# Patient Record
Sex: FEMALE | Race: WHITE | URBAN - METROPOLITAN AREA
[De-identification: names, ages, dates, MRNs, and addresses within clinical notes are randomized per-mention and may not be internally consistent; named-entity substitution may affect disease eponyms.]

---

## 2021-08-28 LAB
ANTIBODY SCREEN OB: NEGATIVE
HEPATITIS B SURFACE ANTIGEN OB: NEGATIVE
HIV RESULT OB: NEGATIVE
RAPID PLASMA REAGIN OB: NONREACTIVE
RH FACTOR OB: POSITIVE

## 2022-01-06 LAB — HIV RESULT OB: NEGATIVE

## 2022-03-03 LAB — STREP GP B CULT OB: NEGATIVE

## 2022-03-29 ENCOUNTER — HOSPITAL ENCOUNTER (INPATIENT)
Facility: HOSPITAL | Age: 32
LOS: 3 days | Discharge: HOME | End: 2022-04-01
Attending: OBSTETRICS & GYNECOLOGY | Admitting: OBSTETRICS & GYNECOLOGY
Payer: COMMERCIAL

## 2022-03-29 PROBLEM — Z34.90 PREGNANCY: Status: ACTIVE | Noted: 2022-03-29

## 2022-03-29 LAB
ANTIBODY SCREEN: NEGATIVE
BASOPHILS # BLD AUTO: 0.07 X10(3) UL (ref 0–0.2)
BASOPHILS NFR BLD AUTO: 0.6 %
EOSINOPHIL # BLD AUTO: 0.13 X10(3) UL (ref 0–0.7)
EOSINOPHIL NFR BLD AUTO: 1.1 %
ERYTHROCYTE [DISTWIDTH] IN BLOOD BY AUTOMATED COUNT: 12.8 %
HCT VFR BLD AUTO: 41.4 %
HGB BLD-MCNC: 14.1 G/DL
IMM GRANULOCYTES # BLD AUTO: 0.06 X10(3) UL (ref 0–1)
IMM GRANULOCYTES NFR BLD: 0.5 %
LYMPHOCYTES # BLD AUTO: 2.17 X10(3) UL (ref 1–4)
LYMPHOCYTES NFR BLD AUTO: 17.8 %
MCH RBC QN AUTO: 29.3 PG (ref 26–34)
MCHC RBC AUTO-ENTMCNC: 34.1 G/DL (ref 31–37)
MCV RBC AUTO: 85.9 FL
MONOCYTES # BLD AUTO: 0.72 X10(3) UL (ref 0.1–1)
MONOCYTES NFR BLD AUTO: 5.9 %
NEUTROPHILS # BLD AUTO: 9.06 X10 (3) UL (ref 1.5–7.7)
NEUTROPHILS # BLD AUTO: 9.06 X10(3) UL (ref 1.5–7.7)
NEUTROPHILS NFR BLD AUTO: 74.1 %
PLATELET # BLD AUTO: 268 10(3)UL (ref 150–450)
RBC # BLD AUTO: 4.82 X10(6)UL
RH BLOOD TYPE: POSITIVE
SARS-COV-2 RNA RESP QL NAA+PROBE: NOT DETECTED
T PALLIDUM AB SER QL IA: NONREACTIVE
WBC # BLD AUTO: 12.2 X10(3) UL (ref 4–11)

## 2022-03-29 PROCEDURE — 99204 OFFICE O/P NEW MOD 45 MIN: CPT

## 2022-03-29 PROCEDURE — 86900 BLOOD TYPING SEROLOGIC ABO: CPT | Performed by: OBSTETRICS & GYNECOLOGY

## 2022-03-29 PROCEDURE — 86780 TREPONEMA PALLIDUM: CPT | Performed by: OBSTETRICS & GYNECOLOGY

## 2022-03-29 PROCEDURE — 85025 COMPLETE CBC W/AUTO DIFF WBC: CPT | Performed by: OBSTETRICS & GYNECOLOGY

## 2022-03-29 PROCEDURE — 86850 RBC ANTIBODY SCREEN: CPT | Performed by: OBSTETRICS & GYNECOLOGY

## 2022-03-29 PROCEDURE — 86901 BLOOD TYPING SEROLOGIC RH(D): CPT | Performed by: OBSTETRICS & GYNECOLOGY

## 2022-03-29 RX ORDER — TRISODIUM CITRATE DIHYDRATE AND CITRIC ACID MONOHYDRATE 500; 334 MG/5ML; MG/5ML
30 SOLUTION ORAL AS NEEDED
Status: DISCONTINUED | OUTPATIENT
Start: 2022-03-29 | End: 2022-03-30 | Stop reason: HOSPADM

## 2022-03-29 RX ORDER — ERGOCALCIFEROL (VITAMIN D2) 10 MCG
400 TABLET ORAL DAILY
COMMUNITY

## 2022-03-29 RX ORDER — SODIUM CHLORIDE, SODIUM LACTATE, POTASSIUM CHLORIDE, CALCIUM CHLORIDE 600; 310; 30; 20 MG/100ML; MG/100ML; MG/100ML; MG/100ML
INJECTION, SOLUTION INTRAVENOUS CONTINUOUS
Status: DISCONTINUED | OUTPATIENT
Start: 2022-03-29 | End: 2022-03-30 | Stop reason: HOSPADM

## 2022-03-29 RX ORDER — ACETAMINOPHEN 500 MG
1000 TABLET ORAL EVERY 6 HOURS PRN
Status: DISCONTINUED | OUTPATIENT
Start: 2022-03-29 | End: 2022-03-30 | Stop reason: DRUGHIGH

## 2022-03-29 RX ORDER — QUINIDINE GLUCONATE 324 MG
240 TABLET, EXTENDED RELEASE ORAL
COMMUNITY
End: 2022-04-01

## 2022-03-29 RX ORDER — TERBUTALINE SULFATE 1 MG/ML
0.25 INJECTION, SOLUTION SUBCUTANEOUS AS NEEDED
Status: DISCONTINUED | OUTPATIENT
Start: 2022-03-29 | End: 2022-03-30 | Stop reason: HOSPADM

## 2022-03-29 RX ORDER — DEXTROSE, SODIUM CHLORIDE, SODIUM LACTATE, POTASSIUM CHLORIDE, AND CALCIUM CHLORIDE 5; .6; .31; .03; .02 G/100ML; G/100ML; G/100ML; G/100ML; G/100ML
INJECTION, SOLUTION INTRAVENOUS AS NEEDED
Status: DISCONTINUED | OUTPATIENT
Start: 2022-03-29 | End: 2022-03-30 | Stop reason: HOSPADM

## 2022-03-29 RX ORDER — PRENATAL VIT/IRON FUM/FOLIC AC 27 MG-1 MG
1 TABLET ORAL DAILY
COMMUNITY

## 2022-03-29 RX ORDER — ONDANSETRON 2 MG/ML
4 INJECTION INTRAMUSCULAR; INTRAVENOUS EVERY 6 HOURS PRN
Status: DISCONTINUED | OUTPATIENT
Start: 2022-03-29 | End: 2022-03-30 | Stop reason: HOSPADM

## 2022-03-29 RX ORDER — AMMONIA INHALANTS 0.04 G/.3ML
0.3 INHALANT RESPIRATORY (INHALATION) AS NEEDED
Status: DISCONTINUED | OUTPATIENT
Start: 2022-03-29 | End: 2022-03-30 | Stop reason: HOSPADM

## 2022-03-29 RX ORDER — HYDROMORPHONE HYDROCHLORIDE 1 MG/ML
1 INJECTION, SOLUTION INTRAMUSCULAR; INTRAVENOUS; SUBCUTANEOUS ONCE
Status: COMPLETED | OUTPATIENT
Start: 2022-03-29 | End: 2022-03-29

## 2022-03-29 RX ORDER — IBUPROFEN 600 MG/1
600 TABLET ORAL EVERY 6 HOURS PRN
Status: DISCONTINUED | OUTPATIENT
Start: 2022-03-29 | End: 2022-03-30 | Stop reason: DRUGHIGH

## 2022-03-29 NOTE — PROGRESS NOTES
Dr Marcelino Floor calls this RN back. This RN informs her pt , 40+1. Pt here for SROM. Pt started leaking fluid at 1200 today. +pooling, +ferning, +amniotest. SVE /-2. Pt having occas ctxs, pt not feeling them. NST reactive. Orders received at this time.

## 2022-03-29 NOTE — PROGRESS NOTES
, 40+1 weeks arrives per ambulation. Pt here for SROM. Pt taken to Triage 3 and changing at this time. pt was just d/freedom an hour ago with so cath .

## 2022-03-29 NOTE — PROGRESS NOTES
EFMs applied, FHTs 140. Pt states she started leaking fluid at 1200 today. Pt states she continues to have small trickles of clear fluid. Pt denies ctxs and vaginal bleeding. +FM. Pt denies any problems with pregnancy. Pt has anemia, denies any other medical problems.

## 2022-03-29 NOTE — PROGRESS NOTES
Pt back in room, EFMs applied, FHTs 155. Pt states she is feeling more ctxs. Pt states they feeling more like \"period cramps\" now. POC discussed with pt. Pt verbalizes understanding and agrees with POC.

## 2022-03-29 NOTE — PROGRESS NOTES
Pt is a 36year old female admitted to -A. Patient presents with:  R/o Rom: LOF since 1200     Pt is  40w1d intra-uterine pregnancy. History obtained, consents signed. Oriented to room, staff, and plan of care.

## 2022-03-29 NOTE — PROGRESS NOTES
Pt transferred to 103 per ambulation. Report given to Marha Xavier RN. Cares of pt transferred at this time.

## 2022-03-30 ENCOUNTER — ANESTHESIA (OUTPATIENT)
Dept: OBGYN UNIT | Facility: HOSPITAL | Age: 32
End: 2022-03-30
Payer: COMMERCIAL

## 2022-03-30 ENCOUNTER — ANESTHESIA EVENT (OUTPATIENT)
Dept: OBGYN UNIT | Facility: HOSPITAL | Age: 32
End: 2022-03-30
Payer: COMMERCIAL

## 2022-03-30 PROCEDURE — 0KQM0ZZ REPAIR PERINEUM MUSCLE, OPEN APPROACH: ICD-10-PCS | Performed by: STUDENT IN AN ORGANIZED HEALTH CARE EDUCATION/TRAINING PROGRAM

## 2022-03-30 RX ORDER — LIDOCAINE HYDROCHLORIDE 10 MG/ML
INJECTION, SOLUTION EPIDURAL; INFILTRATION; INTRACAUDAL; PERINEURAL AS NEEDED
Status: DISCONTINUED | OUTPATIENT
Start: 2022-03-30 | End: 2022-03-30 | Stop reason: SURG

## 2022-03-30 RX ORDER — IBUPROFEN 600 MG/1
600 TABLET ORAL EVERY 6 HOURS
Status: DISCONTINUED | OUTPATIENT
Start: 2022-03-31 | End: 2022-04-01

## 2022-03-30 RX ORDER — SIMETHICONE 80 MG
80 TABLET,CHEWABLE ORAL 3 TIMES DAILY PRN
Status: DISCONTINUED | OUTPATIENT
Start: 2022-03-30 | End: 2022-04-01

## 2022-03-30 RX ORDER — BISACODYL 10 MG
10 SUPPOSITORY, RECTAL RECTAL ONCE AS NEEDED
Status: DISCONTINUED | OUTPATIENT
Start: 2022-03-30 | End: 2022-04-01

## 2022-03-30 RX ORDER — METHYLERGONOVINE MALEATE 0.2 MG/ML
INJECTION INTRAVENOUS
Status: DISCONTINUED
Start: 2022-03-30 | End: 2022-03-30 | Stop reason: WASHOUT

## 2022-03-30 RX ORDER — BUPIVACAINE HCL/0.9 % NACL/PF 0.25 %
5 PLASTIC BAG, INJECTION (ML) EPIDURAL AS NEEDED
Status: DISCONTINUED | OUTPATIENT
Start: 2022-03-30 | End: 2022-03-30

## 2022-03-30 RX ORDER — LIDOCAINE HYDROCHLORIDE AND EPINEPHRINE 15; 5 MG/ML; UG/ML
INJECTION, SOLUTION EPIDURAL AS NEEDED
Status: DISCONTINUED | OUTPATIENT
Start: 2022-03-30 | End: 2022-03-30 | Stop reason: SURG

## 2022-03-30 RX ORDER — DOCUSATE SODIUM 100 MG/1
100 CAPSULE, LIQUID FILLED ORAL
Status: DISCONTINUED | OUTPATIENT
Start: 2022-03-30 | End: 2022-04-01

## 2022-03-30 RX ORDER — TRANEXAMIC ACID 10 MG/ML
INJECTION, SOLUTION INTRAVENOUS
Status: DISCONTINUED
Start: 2022-03-30 | End: 2022-03-30 | Stop reason: WASHOUT

## 2022-03-30 RX ORDER — ACETAMINOPHEN 500 MG
500 TABLET ORAL EVERY 6 HOURS PRN
Status: DISCONTINUED | OUTPATIENT
Start: 2022-03-30 | End: 2022-04-01

## 2022-03-30 RX ORDER — NALBUPHINE HCL 10 MG/ML
2.5 AMPUL (ML) INJECTION
Status: DISCONTINUED | OUTPATIENT
Start: 2022-03-30 | End: 2022-03-30

## 2022-03-30 RX ORDER — CARBOPROST TROMETHAMINE 250 UG/ML
INJECTION, SOLUTION INTRAMUSCULAR
Status: DISCONTINUED
Start: 2022-03-30 | End: 2022-03-30 | Stop reason: WASHOUT

## 2022-03-30 RX ORDER — ACETAMINOPHEN 500 MG
1000 TABLET ORAL EVERY 6 HOURS PRN
Status: DISCONTINUED | OUTPATIENT
Start: 2022-03-30 | End: 2022-04-01

## 2022-03-30 RX ADMIN — LIDOCAINE HYDROCHLORIDE 10 MG: 10 INJECTION, SOLUTION EPIDURAL; INFILTRATION; INTRACAUDAL; PERINEURAL at 01:32:00

## 2022-03-30 RX ADMIN — LIDOCAINE HYDROCHLORIDE AND EPINEPHRINE 5 ML: 15; 5 INJECTION, SOLUTION EPIDURAL at 01:34:00

## 2022-03-30 NOTE — H&P
[facility]    PATIENT'S NAME: Bharathi Jack   ATTENDING PHYSICIAN: Chapito Mariee M.D. PATIENT ACCOUNT#:   [de-identified]    LOCATION:  [address] [place] [address]  MEDICAL RECORD #:   [id number]       YOB: 1990  ADMISSION DATE:       2022    HISTORY AND PHYSICAL EXAMINATION    HISTORY OF PRESENT ILLNESS:  She is a 72-year-old female. She is a  1, para 0, with a due date of 2022, making her 40-1/7 weeks today. She presents to Labor and Delivery with spontaneous rupture of membranes today at noon, clear fluid. On admission to Labor and Delivery, she was noted to be ken irregularly and in no discomfort. She had gross rupture of membranes in triage. She states good fetal movement. Her prenatal care has been good with us. Her prenatal labs are as follows. She initially had the following lab work. Toxoplasma IgG negative. Hemoglobin electrophoresis normal.   Those laboratory values were done on 2021. Her hemoglobin and hematocrit 13.3 and 39, platelets were 899. Her hepatitis B surface antigen was negative. Her rubella was reactive and immune. Her blood type is O positive. Antibody screen negative. Her HIV is nonreactive in the first trimester. She had RPR nonreactive. Her urinalysis was negative. Her vitamin D was 33. Her ferritin of 62. Her urine culture was no growth. She also had parvovirus nonimmune early in the pregnancy. Following that, her group B strep that was done at 36 weeks was negative. Her followup hemoglobin and hematocrit in the third trimester were 12.4 and 36.5, normal platelets. A 1-hour Glucola was normal.  Vitamin D was 49. HIV was negative. PAST MEDICAL HISTORY:  She has a history of strep throat in 2021, treated with penicillin. She had COVID infection in 2021. PAST REPRODUCTIVE HISTORY:  Menarche at age 17, 28-day cycles for 5 days. This is her first pregnancy.     MEDICATIONS:  She takes are vitamin D3 once daily. She takes prenatal vitamins once daily. ALLERGIES:  None. SOCIAL HISTORY:  No smoking, no alcohol, no drug use. FAMILY HISTORY:  Significant for diabetes in her aunt and heart disease in her grandmother. PHYSICAL EXAMINATION:    GENERAL:  The patient is up and ambulating and appears in no distress at this time. VITAL SIGNS:  She is 5 feet 4 inches tall, weighs 156 pounds. Vitals are stable. Strip is reactive with irregular contractions at this time. She is on 10 milliunits of Pitocin. ABDOMEN:  Gravid, nontender. PELVIC:  Per RN, she is 1, 60% and -2 with gross rupture of fluids, clear. EXTREMITIES:  Minimal edema. No evidence of DVT. ASSESSMENT:    1. Intrauterine pregnancy at 40-1/7 weeks. 2.   Spontaneous rupture of membranes. 3.   History of mild anemia during this pregnancy. PLAN:  For this patient is to enhance labor with Pitocin at this time. Pain management was discussed and will be given as requested and as needed.      Dictated By Marvin Pacheco M.D.  d: 03/29/2022 19:35:13  t: 03/29/2022 20:10:16  [facility] [id number]  IA/

## 2022-03-30 NOTE — PROGRESS NOTES
This RN in room, pt moaning in bed and states she is in severe pain. This RN informs pt that her visitors need to leave at this time so she can rest. Perineum checked for hematoma. No hematoma or abnormalities noted per this RN. This RN will call Dr Senia Espinal to come assess pt.

## 2022-03-30 NOTE — PROGRESS NOTES
Pt up to BR with assistance from this RN. Gait steady. Pt voids without difficulty. Jane-bottle given. Dermoplast to perineum. Pt up to [facility] without incident.

## 2022-03-30 NOTE — ANESTHESIA PROCEDURE NOTES
Labor Analgesia  Performed by: Puneet Atkinson MD  Authorized by: Puneet Atkinson MD       General Information and Staff    Start Time:  3/30/2022 1:28 AM  End Time:  3/30/2022 1:36 AM  Anesthesiologist:  Jessica Medrano MD  Performed by:   Anesthesiologist  Patient Location:  OB  Site Identification: surface landmarks    Reason for Block: labor epidural    Preanesthetic Checklist: patient identified, IV checked, risks and benefits discussed, monitors and equipment checked, pre-op evaluation, timeout performed, IV bolus, anesthesia consent and sterile technique used      Procedure Details    Patient Position:  Sitting  Prep: ChloraPrep    Monitoring:  Heart rate and continuous pulse ox  Approach:  Midline    Epidural Needle    Injection Technique:  KERLINE saline  Needle Type:  Tuohy  Needle Gauge:  17 G  Needle Length:  3.375 in  Needle Insertion Depth:  6    Spinal Needle      Catheter    Catheter Type:  End hole  Catheter Size:  19 G  Test Dose:  Negative    Assessment      Additional Comments     Test Dose Given at 0136 am

## 2022-03-30 NOTE — PLAN OF CARE
Problem: BIRTH - VAGINAL/ SECTION  Goal: Fetal and maternal status remain reassuring during the birth process  Description: INTERVENTIONS:  - Monitor vital signs  - Monitor fetal heart rate  - Monitor uterine activity  - Monitor labor progression (vaginal delivery)  - DVT prophylaxis (C/S delivery)  - Surgical antibiotic prophylaxis (C/S delivery)  Outcome: Progressing     Problem: PAIN - ADULT  Goal: Verbalizes/displays adequate comfort level or patient's stated pain goal  Description: INTERVENTIONS:  - Encourage pt to monitor pain and request assistance  - Assess pain using appropriate pain scale  - Administer analgesics based on type and severity of pain and evaluate response  - Implement non-pharmacological measures as appropriate and evaluate response  - Consider cultural and social influences on pain and pain management  - Manage/alleviate anxiety  - Utilize distraction and/or relaxation techniques  - Monitor for opioid side effects  - Notify MD/LIP if interventions unsuccessful or patient reports new pain  - Anticipate increased pain with activity and pre-medicate as appropriate  3/30/2022 1845 by Sandra Beckwith RN  Outcome: Progressing  3/30/2022 1845 by Sandra Beckwith RN  Outcome: Progressing     Problem: ANXIETY  Goal: Will report anxiety at manageable levels  Description: INTERVENTIONS:  - Administer medication as ordered  - Teach and rehearse alternative coping skills  - Provide emotional support with 1:1 interaction with staff  3/30/2022 1845 by Sandra Beckwith RN  Outcome: Progressing  3/30/2022 1845 by Sandra Beckwith RN  Outcome: Progressing     Problem: SAFETY ADULT - FALL  Goal: Free from fall injury  Description: INTERVENTIONS:  - Assess pt frequently for physical needs  - Identify cognitive and physical deficits and behaviors that affect risk of falls.   - [place] fall precautions as indicated by assessment.  - Educate pt/family on patient safety including physical limitations  - Instruct pt to call for assistance with activity based on assessment  - Modify environment to reduce risk of injury  - Provide assistive devices as appropriate  - Consider OT/PT consult to assist with strengthening/mobility  - Encourage toileting schedule  3/30/2022 1845 by Dominguez Schilling RN  Outcome: Progressing  3/30/2022 1845 by Dominguez Schilling RN  Outcome: Progressing     Problem: POSTPARTUM  Goal: Long Term Goal:Experiences normal postpartum course  Description: INTERVENTIONS:  - Assess and monitor vital signs and lab values. - Assess fundus and lochia. - Provide ice/sitz baths for perineum discomfort. - Monitor healing of incision/episiotomy/laceration, and assess for signs and symptoms of infection and hematoma. - Assess bladder function and monitor for bladder distention.  - Provide/instruct/assist with pericare as needed. - Provide VTE prophylaxis as needed. - Monitor bowel function.  - Encourage ambulation and provide assistance as needed. - Assess and monitor emotional status and provide social service/psych resources as needed. - Utilize standard precautions and use personal protective equipment as indicated. Ensure aseptic care of all intravenous lines and invasive tubes/drains.  - Obtain immunization and exposure to communicable diseases history. 3/30/2022 1845 by Dominguez Schilling RN  Outcome: Progressing  3/30/2022 1845 by Dominguez Schilling RN  Outcome: Progressing  Goal: Optimize infant feeding at the breast  Description: INTERVENTIONS:  - Initiate breast feeding within first hour after birth. - Monitor effectiveness of current breast feeding efforts. - Assess support systems available to mother/family.  - Identify cultural beliefs/practices regarding lactation, letdown techniques, maternal food preferences.   - Assess mother's knowledge and previous experience with breast feeding.  - Provide information as needed about early infant feeding cues (e.g., rooting, lip smacking, sucking fingers/hand) versus late cue of crying.  - Discuss/demonstrate breast feeding aids (e.g., infant sling, nursing footstool/pillows, and breast pumps). - Encourage mother/other family members to express feelings/concerns, and actively listen. - Educate father/SO about benefits of breast feeding and how to manage common lactation challenges. - Recommend avoidance of specific medications or substances incompatible with breast feeding.  - Assess and monitor for signs of nipple pain/trauma. - Instruct and provide assistance with proper latch. - Review techniques for milk expression (breast pumping) and storage of breast milk. Provide pumping equipment/supplies, instructions and assistance, as needed. - Encourage rooming-in and breast feeding on demand.  - Encourage skin-to-skin contact. - Provide LC support as needed. - Assess for and manage engorgement. - Provide breast feeding education handouts and information on community breast feeding support. 3/30/2022 1845 by Carol Ram RN  Outcome: Progressing  3/30/2022 1845 by Carol Ram RN  Outcome: Progressing  Goal: Establishment of adequate milk supply with medication/procedure interruptions  Description: INTERVENTIONS:  - Review techniques for milk expression (breast pumping). - Provide pumping equipment/supplies, instructions, and assistance until it is safe to breastfeed infant. 3/30/2022 1845 by Carol Ram RN  Outcome: Progressing  3/30/2022 1845 by Carol Ram RN  Outcome: Progressing  Goal: Experiences normal breast weaning course  Description: INTERVENTIONS:  - Assess for and manage engorgement. - Instruct on breast care. - Provide comfort measures. 3/30/2022 1845 by Carol Ram RN  Outcome: Progressing  3/30/2022 1845 by Carol Ram RN  Outcome: Progressing  Goal: Appropriate maternal -  bonding  Description: INTERVENTIONS:  - Assess caregiver- interactions.   - Assess caregiver's emotional status and coping mechanisms. - Encourage caregiver to participate in  daily care. - Assess support systems available to mother/family.  - Provide /case management support as needed.   3/30/2022 1845 by Linda Estes RN  Outcome: Progressing  3/30/2022 1845 by Linda Estes, RN  Outcome: Progressing

## 2022-03-30 NOTE — PROGRESS NOTES
Dr Linnea Kerns called per this RN. This RN informs her SVE 9/100/+1. Pt having increased dark red vaginal bleeding. Pt was in hands and knees position. This RN was unable to  ctxs. Pt having decels. Pt now on L side. FHTs reassuring at this time. Baseline now 160. Pt afebrile. Dr Linnea Kerns states she will come evaluate.

## 2022-03-30 NOTE — PLAN OF CARE
Problem: SAFETY ADULT - FALL  Goal: Free from fall injury  Description: INTERVENTIONS:  - Assess pt frequently for physical needs  - Identify cognitive and physical deficits and behaviors that affect risk of falls.   - [place] fall precautions as indicated by assessment.  - Educate pt/family on patient safety including physical limitations  - Instruct pt to call for assistance with activity based on assessment  - Modify environment to reduce risk of injury  - Provide assistive devices as appropriate  - Consider OT/PT consult to assist with strengthening/mobility  - Encourage toileting schedule  Outcome: Progressing

## 2022-03-30 NOTE — PLAN OF CARE
Problem: BIRTH - VAGINAL/ SECTION  Goal: Fetal and maternal status remain reassuring during the birth process  Description: INTERVENTIONS:  - Monitor vital signs  - Monitor fetal heart rate  - Monitor uterine activity  - Monitor labor progression (vaginal delivery)  - DVT prophylaxis (C/S delivery)  - Surgical antibiotic prophylaxis (C/S delivery)  3/29/2022 1938 by Noelle Miles RN  Outcome: Progressing  3/29/2022 1937 by Noelle Miles RN  Outcome: Progressing     Problem: PAIN - ADULT  Goal: Verbalizes/displays adequate comfort level or patient's stated pain goal  Description: INTERVENTIONS:  - Encourage pt to monitor pain and request assistance  - Assess pain using appropriate pain scale  - Administer analgesics based on type and severity of pain and evaluate response  - Implement non-pharmacological measures as appropriate and evaluate response  - Consider cultural and social influences on pain and pain management  - Manage/alleviate anxiety  - Utilize distraction and/or relaxation techniques  - Monitor for opioid side effects  - Notify MD/LIP if interventions unsuccessful or patient reports new pain  - Anticipate increased pain with activity and pre-medicate as appropriate  3/29/2022 1938 by Noelle Miles RN  Outcome: Progressing  3/29/2022 1937 by Noelle Miles RN  Outcome: Progressing     Problem: ANXIETY  Goal: Will report anxiety at manageable levels  Description: INTERVENTIONS:  - Administer medication as ordered  - Teach and rehearse alternative coping skills  - Provide emotional support with 1:1 interaction with staff  3/29/2022 1938 by Noelle Miles RN  Outcome: Progressing  3/29/2022 1937 by Noelle Miles RN  Outcome: Progressing     Problem: Patient/Family Goals  Goal: Patient/Family Long Term Goal  Description: Patient's Long Term Goal: Adequate pain control    Interventions:  -   - See additional Care Plan goals for specific interventions  3/29/2022 1938 by Iliana Koo ROME REIS  Outcome: Progressing  3/29/2022 1937 by Abbe Odom RN  Outcome: Progressing  Goal: Patient/Family Short Term Goal  Description: Patient's Short Term Goal: Safe delivery of     Interventions:   -   - See additional Care Plan goals for specific interventions  3/29/2022 1938 by Abbe Odom RN  Outcome: Progressing  3/29/2022 1937 by Abbe Odom RN  Outcome: Progressing

## 2022-03-30 NOTE — PROGRESS NOTES
Dr Karlo Cantrell called per this RN. This RN informs her pt is c/o severe perineum pain. This RN requesting Dr Karlo Cantrell to come assess pt. Dr Karlo Cantrell states she will be in.

## 2022-03-30 NOTE — PLAN OF CARE
Problem: BIRTH - VAGINAL/ SECTION  Goal: Fetal and maternal status remain reassuring during the birth process  Description: INTERVENTIONS:  - Monitor vital signs  - Monitor fetal heart rate  - Monitor uterine activity  - Monitor labor progression (vaginal delivery)  - DVT prophylaxis (C/S delivery)  - Surgical antibiotic prophylaxis (C/S delivery)  Outcome: Progressing     Problem: PAIN - ADULT  Goal: Verbalizes/displays adequate comfort level or patient's stated pain goal  Description: INTERVENTIONS:  - Encourage pt to monitor pain and request assistance  - Assess pain using appropriate pain scale  - Administer analgesics based on type and severity of pain and evaluate response  - Implement non-pharmacological measures as appropriate and evaluate response  - Consider cultural and social influences on pain and pain management  - Manage/alleviate anxiety  - Utilize distraction and/or relaxation techniques  - Monitor for opioid side effects  - Notify MD/LIP if interventions unsuccessful or patient reports new pain  - Anticipate increased pain with activity and pre-medicate as appropriate  Outcome: Progressing     Problem: ANXIETY  Goal: Will report anxiety at manageable levels  Description: INTERVENTIONS:  - Administer medication as ordered  - Teach and rehearse alternative coping skills  - Provide emotional support with 1:1 interaction with staff  Outcome: Progressing     Problem: Patient/Family Goals  Goal: Patient/Family Long Term Goal  Description: Patient's Long Term Goal: Adequate pain control    Interventions:  -   - See additional Care Plan goals for specific interventions  Outcome: Progressing  Goal: Patient/Family Short Term Goal  Description: Patient's Short Term Goal: Safe delivery of     Interventions:   -   - See additional Care Plan goals for specific interventions  Outcome: Progressing     Problem: SAFETY ADULT - FALL  Goal: Free from fall injury  Description: INTERVENTIONS:  - Assess pt frequently for physical needs  - Identify cognitive and physical deficits and behaviors that affect risk of falls.   - [place] fall precautions as indicated by assessment.  - Educate pt/family on patient safety including physical limitations  - Instruct pt to call for assistance with activity based on assessment  - Modify environment to reduce risk of injury  - Provide assistive devices as appropriate  - Consider OT/PT consult to assist with strengthening/mobility  - Encourage toileting schedule  Outcome: Progressing

## 2022-03-30 NOTE — L&D DELIVERY NOTE
Simona Engle [[id number]]    Labor Events     labor?: No   steroids?: None  Antibiotics received during labor?: Yes  Antibiotics (enter # doses in comment): ampicillin (Comment: x3 doses)  Rupture date/time: 3/29/2022 1200     Rupture type: SROM  Fluid color: Clear  Induction: None  Augmentation: Oxytocin  Indications for augmentation: Ineffective Contraction Pattern     Labor Event Times    Labor onset date/time: 3/30/2022 0100  Dilation complete date/time: 3/30/2022 1203  Start pushing date/time: 3/30/2022 1209     [place] Presentation    Presentation: Vertex  Position: Occiput Anterior     Operative Delivery    Operative Vaginal Delivery: No            Shoulder Dystocia    Shoulder Dystocia: No     Anesthesia    Method: Epidural          [place] Delivery    Head delivery date/time: 3/30/2022 15:24:37   Delivery date/time:  3/30/22 15:25:00   Delivery type: Normal spontaneous vaginal delivery    Details:     Delivery location: delivery room     Delivery Providers    Delivering Clinician: Seth Heard MD   Delivery personnel:  Provider Role   Yvonne Cherry RN Baby Nurse   Sarah Nina RN Delivery Nurse         Cord    Vessels: 3 Vessels  Complications: Nuchal  # of loops: 1  Timed cord clamping: Yes  Time in sec: 61  Cord blood disposition: to lab  Gases sent?: No     Resuscitation    Method: None      Measurements    Weight: 3280 g 7 lb 3.7 oz Length: 49.5 cm   Head circum.: 31 cm Chest circum.: 31.5 cm      Abdominal circum.: 30.5 cm       Placenta    Date/time: 3/30/2022 1529  Removal: Spontaneous  Appearance: Intact     Apgars    Living status: Living   Apgar Scoring Key:    0 1 2    Skin color Blue or pale Acrocyanotic Completely pink    Heart rate Absent <100 bpm >100 bpm    Reflex irritability No response Grimace Cry or active withdrawal    Muscle tone Limp Some flexion Active motion    Respiratory effort Absent Weak cry; hypoventilation Good, crying              1 Minute: 5 Minute:  10 Minute:  15 Minute:  20 Minute:    Skin color: 0  1       Heart rate: 2  2       Reflex irritablity: 2  2       Muscle tone: 2  2       Respiratory effort: 2  2       Total: 8  9          Apgars assigned by: Lilian Rojo RN   disposition: with mother     Skin to Skin    Skin to skin initiated date/time: 3/30/2022 1530  Skin to skin with: Mother     Vaginal Count    Initial count RN: Brenden Gusman RN  Initial count Tech: Erin BAXTER   Sponges   Sharps    Initial counts 11   0    Final counts           Delivery (Maternal)    Episiotomy: None  Perineal lacerations: 2nd Repaired?: Yes   Quantitative blood loss (mL): 266                                                                    Vaginal Delivery Note          Leah Cordia Patient Status:  Inpatient    12/3/1990 MRN [id number]   Location [address] [place] [address] Attending Grace Cardona MD, MD   Hosp Day # 1 PCP No primary care provider on file. Preprocedure Dx:  IUP at 40w2d, spontaneous labor, anemia in pregnancy, vaginal bleeding prior to pushing, prolonged rupture of labor    Post Procedure Dx: Same - delivered    Procedure: Normal Spontaneous Vaginal Delivery    Physician: Bety Mcclellan MD    Anesthesia: Epidural    QBL: 266 mL    Findings:   1) A viable male infant with Apgars of 8 and 9 weighing 7lbs 3.7 oz was delivered in JOVANI position. Tight nuchal cord x1 could not be reduced and was delivered through. 2) 3 vessel cord. 3) Normal appearing placenta spontaneously delivered intact. 4) 2nd degree laceration    Procedure:  Patient is a 32y/o  who presented at 40w1d gestation complaining of leakage of fluid. Patient was admitted to labor and delivery. She did not progress with walking. Pitocin was started for augmentation. Antibiotic prophylaxis was started for prolonged rupture of membranes. Epidural was placed per patient request. Her labor progressed, and upon complete dilation she had a strong urge to push. The patient pushed for approximately 3 hrs 15min. As the head was delivered, the perineum was supported to decrease the risk of tearing. The infant was delivered in the JOVANI position. A tight nuchal cord could not be reduced. The anterior shoulder delivered followed by the posterior shoulder and remainder of the infant. The nuchal cord was reduced after delivery. The infant's mouth and nose were bulb suctioned. The cord was doubly clamped and cut after a 40 second delay. The baby was placed on the mothers abdomen vigorous and crying. The cord blood was collected, and the placenta spontaneously delivered intact shortly thereafter. Bimanual exam was performed to the fundus, and clots were expressed. Retained products were not palpated. Examination of the cervix, vagina, and perineum demonstrated a 2nd degree perineal laceration. The vaginal portion of the laceration was repaired using 2-0 vicryl in a running locked fashion from the apex of the tear to the level of the hymenal ring. A deep crown stitch was placed, and the perineal body was re-approximated using 2-0 vicryl in an running fashion. The skin was re-approximated using 2-0 vicryl in a subcuticular fashion. Bleeding was minimal. The patient was then moved to the supine position in stable condition. Counts were correct.     Complications:  None    Ignacio Dobbs MD  3/30/2022  4:09 PM

## 2022-03-31 LAB
BASOPHILS # BLD AUTO: 0.1 X10(3) UL (ref 0–0.2)
BASOPHILS NFR BLD AUTO: 0.4 %
DEPRECATED HBV CORE AB SER IA-ACNC: 59.4 NG/ML
EOSINOPHIL # BLD AUTO: 0.24 X10(3) UL (ref 0–0.7)
EOSINOPHIL NFR BLD AUTO: 1.1 %
ERYTHROCYTE [DISTWIDTH] IN BLOOD BY AUTOMATED COUNT: 13.1 %
HCT VFR BLD AUTO: 35.1 %
HGB BLD-MCNC: 11.9 G/DL
IMM GRANULOCYTES # BLD AUTO: 0.18 X10(3) UL (ref 0–1)
IMM GRANULOCYTES NFR BLD: 0.8 %
LYMPHOCYTES # BLD AUTO: 2.52 X10(3) UL (ref 1–4)
LYMPHOCYTES NFR BLD AUTO: 11.3 %
MCH RBC QN AUTO: 29.8 PG (ref 26–34)
MCV RBC AUTO: 88 FL
MONOCYTES # BLD AUTO: 1.32 X10(3) UL (ref 0.1–1)
MONOCYTES NFR BLD AUTO: 5.9 %
NEUTROPHILS # BLD AUTO: 17.9 X10 (3) UL (ref 1.5–7.7)
NEUTROPHILS # BLD AUTO: 17.9 X10(3) UL (ref 1.5–7.7)
NEUTROPHILS NFR BLD AUTO: 80.5 %
PLATELET # BLD AUTO: 230 10(3)UL (ref 150–450)
RBC # BLD AUTO: 3.99 X10(6)UL
WBC # BLD AUTO: 22.3 X10(3) UL (ref 4–11)

## 2022-03-31 PROCEDURE — 85025 COMPLETE CBC W/AUTO DIFF WBC: CPT | Performed by: STUDENT IN AN ORGANIZED HEALTH CARE EDUCATION/TRAINING PROGRAM

## 2022-03-31 PROCEDURE — 82728 ASSAY OF FERRITIN: CPT | Performed by: STUDENT IN AN ORGANIZED HEALTH CARE EDUCATION/TRAINING PROGRAM

## 2022-03-31 NOTE — PLAN OF CARE
Problem: SAFETY ADULT - FALL  Goal: Free from fall injury  Description: INTERVENTIONS:  - Assess pt frequently for physical needs  - Identify cognitive and physical deficits and behaviors that affect risk of falls.   - [place] fall precautions as indicated by assessment.  - Educate pt/family on patient safety including physical limitations  - Instruct pt to call for assistance with activity based on assessment  - Modify environment to reduce risk of injury  - Provide assistive devices as appropriate  - Consider OT/PT consult to assist with strengthening/mobility  - Encourage toileting schedule  Outcome: Progressing

## 2022-03-31 NOTE — PLAN OF CARE
Problem: SAFETY ADULT - FALL  Goal: Free from fall injury  Description: INTERVENTIONS:  - Assess pt frequently for physical needs  - Identify cognitive and physical deficits and behaviors that affect risk of falls. - [place] fall precautions as indicated by assessment.  - Educate pt/family on patient safety including physical limitations  - Instruct pt to call for assistance with activity based on assessment  - Modify environment to reduce risk of injury  - Provide assistive devices as appropriate  - Consider OT/PT consult to assist with strengthening/mobility  - Encourage toileting schedule  Outcome: Progressing     Problem: POSTPARTUM  Goal: Long Term Goal:Experiences normal postpartum course  Description: INTERVENTIONS:  - Assess and monitor vital signs and lab values. - Assess fundus and lochia. - Provide ice/sitz baths for perineum discomfort. - Monitor healing of incision/episiotomy/laceration, and assess for signs and symptoms of infection and hematoma. - Assess bladder function and monitor for bladder distention.  - Provide/instruct/assist with pericare as needed. - Provide VTE prophylaxis as needed. - Monitor bowel function.  - Encourage ambulation and provide assistance as needed. - Assess and monitor emotional status and provide social service/psych resources as needed. - Utilize standard precautions and use personal protective equipment as indicated. Ensure aseptic care of all intravenous lines and invasive tubes/drains.  - Obtain immunization and exposure to communicable diseases history. Outcome: Progressing  Goal: Optimize infant feeding at the breast  Description: INTERVENTIONS:  - Initiate breast feeding within first hour after birth. - Monitor effectiveness of current breast feeding efforts. - Assess support systems available to mother/family.  - Identify cultural beliefs/practices regarding lactation, letdown techniques, maternal food preferences.   - Assess mother's knowledge and previous experience with breast feeding.  - Provide information as needed about early infant feeding cues (e.g., rooting, lip smacking, sucking fingers/hand) versus late cue of crying.  - Discuss/demonstrate breast feeding aids (e.g., infant sling, nursing footstool/pillows, and breast pumps). - Encourage mother/other family members to express feelings/concerns, and actively listen. - Educate father/SO about benefits of breast feeding and how to manage common lactation challenges. - Recommend avoidance of specific medications or substances incompatible with breast feeding.  - Assess and monitor for signs of nipple pain/trauma. - Instruct and provide assistance with proper latch. - Review techniques for milk expression (breast pumping) and storage of breast milk. Provide pumping equipment/supplies, instructions and assistance, as needed. - Encourage rooming-in and breast feeding on demand.  - Encourage skin-to-skin contact. - Provide LC support as needed. - Assess for and manage engorgement. - Provide breast feeding education handouts and information on community breast feeding support. Outcome: Progressing  Goal: Establishment of adequate milk supply with medication/procedure interruptions  Description: INTERVENTIONS:  - Review techniques for milk expression (breast pumping). - Provide pumping equipment/supplies, instructions, and assistance until it is safe to breastfeed infant. Outcome: Progressing  Goal: Appropriate maternal -  bonding  Description: INTERVENTIONS:  - Assess caregiver- interactions. - Assess caregiver's emotional status and coping mechanisms. - Encourage caregiver to participate in  daily care. - Assess support systems available to mother/family.  - Provide /case management support as needed.   Outcome: Progressing

## 2022-03-31 NOTE — PLAN OF CARE
Problem: BIRTH - VAGINAL/ SECTION  Goal: Fetal and maternal status remain reassuring during the birth process  Description: INTERVENTIONS:  - Monitor vital signs  - Monitor fetal heart rate  - Monitor uterine activity  - Monitor labor progression (vaginal delivery)  - DVT prophylaxis (C/S delivery)  - Surgical antibiotic prophylaxis (C/S delivery)  3/30/2022 1912 by Brenden Gusman RN  Outcome: Completed  3/30/2022 0755 by Brenden Gusman RN  Outcome: Progressing     Problem: PAIN - ADULT  Goal: Verbalizes/displays adequate comfort level or patient's stated pain goal  Description: INTERVENTIONS:  - Encourage pt to monitor pain and request assistance  - Assess pain using appropriate pain scale  - Administer analgesics based on type and severity of pain and evaluate response  - Implement non-pharmacological measures as appropriate and evaluate response  - Consider cultural and social influences on pain and pain management  - Manage/alleviate anxiety  - Utilize distraction and/or relaxation techniques  - Monitor for opioid side effects  - Notify MD/LIP if interventions unsuccessful or patient reports new pain  - Anticipate increased pain with activity and pre-medicate as appropriate  3/30/2022 1912 by Brenden Gusman RN  Outcome: Completed  3/30/2022 0755 by Brenden Gusman RN  Outcome: Progressing     Problem: ANXIETY  Goal: Will report anxiety at manageable levels  Description: INTERVENTIONS:  - Administer medication as ordered  - Teach and rehearse alternative coping skills  - Provide emotional support with 1:1 interaction with staff  3/30/2022 1912 by Brenden Gusman RN  Outcome: Completed  3/30/2022 0755 by Brenden Gusman RN  Outcome: Progressing     Problem: Patient/Family Goals  Goal: Patient/Family Long Term Goal  Description: Patient's Long Term Goal: Adequate pain control    Interventions:  -   - See additional Care Plan goals for specific interventions  3/30/2022 1912 by Brenden Gusman RN  Outcome: Completed  3/30/2022 0755 by Lucio Pike RN  Outcome: Progressing  Goal: Patient/Family Short Term Goal  Description: Patient's Short Term Goal: Safe delivery of     Interventions:   -   - See additional Care Plan goals for specific interventions  3/30/2022 1912 by Lucio Pike RN  Outcome: Completed  3/30/2022 0755 by Lucio Pike RN  Outcome: Progressing     Problem: SAFETY ADULT - FALL  Goal: Free from fall injury  Description: INTERVENTIONS:  - Assess pt frequently for physical needs  - Identify cognitive and physical deficits and behaviors that affect risk of falls.   - [place] fall precautions as indicated by assessment.  - Educate pt/family on patient safety including physical limitations  - Instruct pt to call for assistance with activity based on assessment  - Modify environment to reduce risk of injury  - Provide assistive devices as appropriate  - Consider OT/PT consult to assist with strengthening/mobility  - Encourage toileting schedule  3/30/2022 1912 by Lucio Pike RN  Outcome: Progressing  3/30/2022 0755 by Lucio Pike RN  Outcome: Progressing

## 2022-04-01 VITALS
HEIGHT: 64 IN | HEART RATE: 70 BPM | WEIGHT: 158 LBS | OXYGEN SATURATION: 99 % | TEMPERATURE: 99 F | RESPIRATION RATE: 18 BRPM | SYSTOLIC BLOOD PRESSURE: 116 MMHG | BODY MASS INDEX: 26.98 KG/M2 | DIASTOLIC BLOOD PRESSURE: 65 MMHG

## 2022-04-01 RX ORDER — IBUPROFEN 600 MG/1
600 TABLET ORAL EVERY 6 HOURS PRN
Qty: 30 TABLET | Refills: 0 | Status: SHIPPED | OUTPATIENT
Start: 2022-04-01

## 2022-04-01 NOTE — PROGRESS NOTES
All discharge teaching reviewed and questions answered. Jeanne and kisses discharged from the system. Baby secure in car seat. Family escorted [place] by [facility] [place]. Annette Bradley

## 2022-04-01 NOTE — DISCHARGE SUMMARY
[facility]  Discharge Summary    Renée Mejia Patient Status:  Inpatient    12/3/1990 MRN [id number]   [facility] Mechelle Miranda MD, MD   Hosp Day # 3 PCP No primary care provider on file. Date of Admission: 3/29/2022    Date of Discharge: 22      Admitting Diagnosis: pregnancy  Pregnancy    Discharge Diagnosis: Patient Active Problem List:     Pregnancy      Reason for Admission:Iup at term , srom            Hospital Course: as below        Procedures:      Complications: none    Disposition: Final discharge disposition not confirmed    Discharge Condition: Stable    Discharge Medications: Current Discharge Medication List    START taking these medications    ibuprofen 600 MG Oral Tab  Take 1 tablet (600 mg total) by mouth every 6 (six) hours as needed for Pain. Qty: 30 tablet Refills: 0      CONTINUE these medications which have NOT CHANGED    Prenatal Vit-Fe Fumarate-FA (PRENATAL/FOLIC ACID) Oral Tab  Take 1 tablet by mouth daily. Vitamin D3, Cholecalciferol, 10 MCG (400 UNIT) Oral Tab  Take 400 Units by mouth daily. STOP taking these medications    Ferrous Gluconate 240 (27 Fe) MG Oral Tab                      Keith Devine MD  2022  11:42 [facility]  Discharge Summary    Renée Mejia Patient Status:  Inpatient    12/3/1990 MRN [id number]   Location [facility] Attending Mechelle Miranda MD, MD   Hosp Day # 3 PCP No primary care provider on file. Date of Admission: 3/29/2022    Date of Discharge: 22      Admitting Diagnosis: pregnancy  Pregnancy    Discharge Diagnosis: Patient Active Problem List:     Pregnancy      Reason for Admission: iup at 40 1/7 weeks with [facility] Course: 35 y/o G! P0 who came in with srom and then progressed slowly in labor once pitocin started. She received epidural and antibiotics one she was ruptured for 18 hours.  She pushed for 3 hours and had a  of a viable male infant. She did well postpartum and went home with the baby on ppd 2. Instructions were reviewed. Procedures:     Complications: none    Disposition: Final discharge disposition not confirmed    Discharge Condition: Stable    Discharge Medications: Current Discharge Medication List    START taking these medications    ibuprofen 600 MG Oral Tab  Take 1 tablet (600 mg total) by mouth every 6 (six) hours as needed for Pain. Qty: 30 tablet Refills: 0      CONTINUE these medications which have NOT CHANGED    Prenatal Vit-Fe Fumarate-FA (PRENATAL/FOLIC ACID) Oral Tab  Take 1 tablet by mouth daily. Vitamin D3, Cholecalciferol, 10 MCG (400 UNIT) Oral Tab  Take 400 Units by mouth daily.       STOP taking these medications    Ferrous Gluconate 240 (27 Fe) MG Oral Tab    Diet: general  Activity: pelvic rest                  Margaret Pascal MD  2022  11:42 AM

## 2022-04-01 NOTE — PLAN OF CARE
Problem: SAFETY ADULT - FALL  Goal: Free from fall injury  Description: INTERVENTIONS:  - Assess pt frequently for physical needs  - Identify cognitive and physical deficits and behaviors that affect risk of falls. - [place] fall precautions as indicated by assessment.  - Educate pt/family on patient safety including physical limitations  - Instruct pt to call for assistance with activity based on assessment  - Modify environment to reduce risk of injury  - Provide assistive devices as appropriate  - Consider OT/PT consult to assist with strengthening/mobility  - Encourage toileting schedule  Outcome: Completed     Problem: POSTPARTUM  Goal: Long Term Goal:Experiences normal postpartum course  Description: INTERVENTIONS:  - Assess and monitor vital signs and lab values. - Assess fundus and lochia. - Provide ice/sitz baths for perineum discomfort. - Monitor healing of incision/episiotomy/laceration, and assess for signs and symptoms of infection and hematoma. - Assess bladder function and monitor for bladder distention.  - Provide/instruct/assist with pericare as needed. - Provide VTE prophylaxis as needed. - Monitor bowel function.  - Encourage ambulation and provide assistance as needed. - Assess and monitor emotional status and provide social service/psych resources as needed. - Utilize standard precautions and use personal protective equipment as indicated. Ensure aseptic care of all intravenous lines and invasive tubes/drains.  - Obtain immunization and exposure to communicable diseases history. Outcome: Progressing  Goal: Optimize infant feeding at the breast  Description: INTERVENTIONS:  - Initiate breast feeding within first hour after birth. - Monitor effectiveness of current breast feeding efforts. - Assess support systems available to mother/family.  - Identify cultural beliefs/practices regarding lactation, letdown techniques, maternal food preferences.   - Assess mother's knowledge and previous experience with breast feeding.  - Provide information as needed about early infant feeding cues (e.g., rooting, lip smacking, sucking fingers/hand) versus late cue of crying.  - Discuss/demonstrate breast feeding aids (e.g., infant sling, nursing footstool/pillows, and breast pumps). - Encourage mother/other family members to express feelings/concerns, and actively listen. - Educate father/SO about benefits of breast feeding and how to manage common lactation challenges. - Recommend avoidance of specific medications or substances incompatible with breast feeding.  - Assess and monitor for signs of nipple pain/trauma. - Instruct and provide assistance with proper latch. - Review techniques for milk expression (breast pumping) and storage of breast milk. Provide pumping equipment/supplies, instructions and assistance, as needed. - Encourage rooming-in and breast feeding on demand.  - Encourage skin-to-skin contact. - Provide LC support as needed. - Assess for and manage engorgement. - Provide breast feeding education handouts and information on community breast feeding support. Outcome: Progressing  Goal: Establishment of adequate milk supply with medication/procedure interruptions  Description: INTERVENTIONS:  - Review techniques for milk expression (breast pumping). - Provide pumping equipment/supplies, instructions, and assistance until it is safe to breastfeed infant. Outcome: Progressing  Goal: Appropriate maternal -  bonding  Description: INTERVENTIONS:  - Assess caregiver- interactions. - Assess caregiver's emotional status and coping mechanisms. - Encourage caregiver to participate in  daily care. - Assess support systems available to mother/family.  - Provide /case management support as needed.   Outcome: Progressing

## 2022-04-03 ENCOUNTER — TELEPHONE (OUTPATIENT)
Dept: OBGYN UNIT | Facility: HOSPITAL | Age: 32
End: 2022-04-03

## 2022-04-04 ENCOUNTER — TELEPHONE (OUTPATIENT)
Dept: OBGYN UNIT | Facility: HOSPITAL | Age: 32
End: 2022-04-04